# Patient Record
Sex: MALE | Race: AMERICAN INDIAN OR ALASKA NATIVE
[De-identification: names, ages, dates, MRNs, and addresses within clinical notes are randomized per-mention and may not be internally consistent; named-entity substitution may affect disease eponyms.]

---

## 2021-02-03 ENCOUNTER — HOSPITAL ENCOUNTER (EMERGENCY)
Dept: HOSPITAL 5 - ED | Age: 21
Discharge: LEFT BEFORE DISCHARGE | End: 2021-02-03
Payer: SELF-PAY

## 2021-02-03 VITALS — SYSTOLIC BLOOD PRESSURE: 155 MMHG | DIASTOLIC BLOOD PRESSURE: 66 MMHG

## 2021-02-03 DIAGNOSIS — Z53.21: ICD-10-CM

## 2021-02-03 DIAGNOSIS — M25.572: Primary | ICD-10-CM

## 2021-02-03 NOTE — EMERGENCY DEPARTMENT REPORT
Chief Complaint: Extremity Injury, Lower


Stated Complaint: ANKLE INJURY


Time Seen by Provider: 02/03/21 07:52





- HPI


History of Present Illness: 





20-year-old -American male presents to the emergency room for left ankle 

injury.  Patient states that he had sprained his ankle while at work yesterday. 

Patient presents to the emergency room today.  Patient is taking nothing for his

pain.  Patient states he has been able to walk on it without any problems.  

Patient reports his pain is located in the medial aspect of his left ankle.





- Exam


Vital Signs: 


                                   Vital Signs











  02/03/21





  07:48


 


Temperature 98.5 F


 


Pulse Rate 88


 


Respiratory 20





Rate 


 


Blood Pressure 155/66





[Right] 


 


O2 Sat by Pulse 99





Oximetry 











Physical Exam: 





Alert and oriented x3 no acute distress nontoxic in appearance


Left ankle full range of motion no obvious deformity minimal swelling on the 

opposite side of the pain.  Nonerythematous


Patient is ambulating without difficulties.


MSE screening note: 


Focused history and physical exam performed.


Due to findings the following was ordered:





20-year-old -American male presents to the emergency room for left ankle 

injury.  Patient states that he had sprained his ankle while at work yesterday. 

 Patient presents to the emergency room today.  Patient is taking nothing for 

his pain.  Patient states he has been able to walk on it without any problems.  

Patient reports his pain is located in the medial aspect of his left ankle.








Discussed with patient that he does have a ankle sprain recommend Ace bandage 

Tylenol ibuprofen or Aleve for pain management.  Referral to Dr. Cunningham if 

symptoms persist.  Work excuse attached to paperwork.





ED Disposition for MSE


Clinical Impression: 


Ankle sprain


Qualifiers:


 Encounter type: initial encounter Involved ligament of ankle: unspecified 

ligament Laterality: left Qualified Code(s): S93.402A - Sprain of unspecified 

ligament of left ankle, initial encounter





Disposition: Z-07 MED SCREENING EXAM-LEFT


Is pt being admited?: No


Does the pt Need Aspirin: No


Condition: Stable


Instructions:  Ankle Sprain, Easy-to-Read


Additional Instructions: 


Recommend ibuprofen or Tylenol or even Aleve for pain management.  You can place

 an ice bandage on your ankle.


Referrals: 


RUCHI CUNNINGHAM MD [Staff Physician] - 3-5 Days


Forms:  Work/School Release Form(ED)

## 2021-09-30 ENCOUNTER — HOSPITAL ENCOUNTER (EMERGENCY)
Dept: HOSPITAL 5 - ED | Age: 21
LOS: 1 days | Discharge: HOME | End: 2021-10-01
Payer: SELF-PAY

## 2021-09-30 DIAGNOSIS — F20.9: Primary | ICD-10-CM

## 2021-09-30 DIAGNOSIS — F31.9: ICD-10-CM

## 2021-09-30 DIAGNOSIS — I10: ICD-10-CM

## 2021-09-30 DIAGNOSIS — Z72.89: ICD-10-CM

## 2021-09-30 DIAGNOSIS — Z79.899: ICD-10-CM

## 2021-09-30 LAB
BASOPHILS # (AUTO): 0.2 K/MM3 (ref 0–0.1)
BASOPHILS NFR BLD AUTO: 2.2 % (ref 0–1.8)
BUN SERPL-MCNC: 15 MG/DL (ref 9–20)
BUN/CREAT SERPL: 15 %
CALCIUM SERPL-MCNC: 9.5 MG/DL (ref 8.4–10.2)
EOSINOPHIL # BLD AUTO: 0.2 K/MM3 (ref 0–0.4)
EOSINOPHIL NFR BLD AUTO: 2.3 % (ref 0–4.3)
HCT VFR BLD CALC: 40 % (ref 35.5–45.6)
HEMOLYSIS INDEX: 22
HGB BLD-MCNC: 13.4 GM/DL (ref 11.8–15.2)
LYMPHOCYTES # BLD AUTO: 1.7 K/MM3 (ref 1.2–5.4)
LYMPHOCYTES NFR BLD AUTO: 24.9 % (ref 13.4–35)
MCHC RBC AUTO-ENTMCNC: 34 % (ref 32–34)
MCV RBC AUTO: 91 FL (ref 84–94)
MONOCYTES # (AUTO): 0.7 K/MM3 (ref 0–0.8)
MONOCYTES % (AUTO): 10.6 % (ref 0–7.3)
PLATELET # BLD: 293 K/MM3 (ref 140–440)
RBC # BLD AUTO: 4.42 M/MM3 (ref 3.65–5.03)

## 2021-09-30 PROCEDURE — 85025 COMPLETE CBC W/AUTO DIFF WBC: CPT

## 2021-09-30 PROCEDURE — 80307 DRUG TEST PRSMV CHEM ANLYZR: CPT

## 2021-09-30 PROCEDURE — 36415 COLL VENOUS BLD VENIPUNCTURE: CPT

## 2021-09-30 PROCEDURE — 81001 URINALYSIS AUTO W/SCOPE: CPT

## 2021-09-30 PROCEDURE — 80048 BASIC METABOLIC PNL TOTAL CA: CPT

## 2021-09-30 PROCEDURE — G0480 DRUG TEST DEF 1-7 CLASSES: HCPCS

## 2021-09-30 PROCEDURE — 99284 EMERGENCY DEPT VISIT MOD MDM: CPT

## 2021-09-30 PROCEDURE — 80320 DRUG SCREEN QUANTALCOHOLS: CPT

## 2021-09-30 NOTE — EMERGENCY DEPARTMENT REPORT
ED Psych HPI





- General


Chief Complaint: Psych


Stated Complaint: IRREGULAR HEARTBEAT


Time Seen by Provider: 09/30/21 21:06


Source: patient


Mode of arrival: Ambulatory





- History of Present Illness


Initial Comments: 





20-year-old male, history of bipolar disorder, schizophrenia, presents to ED for

mental health evaluation.  According to triage note, patient's mother called EMS

because patient was standing on the corner wearing only his underwear.  Mother 

reports patient's heart rate was elevated at home, however it is normal here in 

the ED.  Mother reports patient is noncompliant with his medications.  Patient 

refusing to answer any questions.  When I asked the patient if he was going to 

speak to me, patient shook his head "no".





MD Complaint: other


-: This evening


Improves With: medication


Worsens With: none


Context: not taking psychiatric


Treatments Prior to Arrival: none





- Related Data


                                  Previous Rx's











 Medication  Instructions  Recorded  Last Taken  Type


 


Ibuprofen [Motrin 800 MG tab] 800 mg PO Q8HR PRN #30 tablet 06/30/20 Unknown Rx











                                    Allergies











Allergy/AdvReac Type Severity Reaction Status Date / Time


 


No Known Allergies Allergy   Verified 02/03/21 07:45














ED Review of Systems


ROS: 


Stated complaint: IRREGULAR HEARTBEAT


Other details as noted in HPI





Comment: Unobtainable due to pts medical conditions





ED Past Medical Hx





- Past Medical History


Previous Medical History?: Yes


Hx Hypertension: Yes


Hx Psychiatric Treatment: Yes (schizophrenia bipolar)


Additional medical history: bronchitis





- Surgical History


Past Surgical History?: No





- Social History


Smoking Status: Never Smoker


Substance Use Type: Alcohol





- Medications


Home Medications: 


                                Home Medications











 Medication  Instructions  Recorded  Confirmed  Last Taken  Type


 


Ibuprofen [Motrin 800 MG tab] 800 mg PO Q8HR PRN #30 tablet 06/30/20  Unknown Rx














ED Physical Exam





- General


Limitations: No Limitations


General appearance: alert, in no apparent distress





- Head


Head exam: Present: atraumatic, normocephalic





- Eye


Eye exam: Present: normal appearance, EOMI





- ENT


ENT exam: Present: mucous membranes moist





- Neck


Neck exam: Present: normal inspection





- Respiratory


Respiratory exam: Present: normal lung sounds bilaterally.  Absent: respiratory 

distress





- Cardiovascular


Cardiovascular Exam: Present: regular rate, normal rhythm





- GI/Abdominal


GI/Abdominal exam: Absent: distended





- Extremities Exam


Extremities exam: Present: normal inspection





- Neurological Exam


Neurological exam: Present: alert





- Psychiatric


Psychiatric exam: Present: flat affect





- Skin


Skin exam: Present: warm, dry, intact, normal color





ED Course


                                   Vital Signs











  09/30/21 09/30/21 09/30/21





  20:26 21:28 21:57


 


Temperature 98.8 F 98.5 F 


 


Pulse Rate 74 79 


 


Respiratory 18 18 18





Rate   


 


Blood Pressure 150/59  


 


Blood Pressure  136/61 





[Left]   


 


O2 Sat by Pulse 94 95 98





Oximetry   














ED Medical Decision Making





- Lab Data


Result diagrams: 


                                 09/30/21 21:06





                                 09/30/21 21:06





- Medical Decision Making





20-year-old male, history of schizophrenia, bipolar, presents to ED with bizarre

 behavior.  Patient is reportedly not taking his psychiatric medications.  

Patient refusing to answer questions with me.  Vital signs are stable.  Labs are

 unremarkable.  Still awaiting urine for UA and tox screen, however patient is 

medically clear for mental health evaluation.  Will dispo per psych.


Critical care attestation.: 


If time is entered above; I have spent that time in minutes in the direct care 

of this critically ill patient, excluding procedure time.








ED Disposition


Clinical Impression: 


 Schizophrenia





Condition: Stable

## 2021-10-01 VITALS — DIASTOLIC BLOOD PRESSURE: 80 MMHG | SYSTOLIC BLOOD PRESSURE: 152 MMHG

## 2021-10-01 LAB
BILIRUB UR QL STRIP: (no result)
BLOOD UR QL VISUAL: (no result)
MUCOUS THREADS #/AREA URNS HPF: (no result) /HPF
PH UR STRIP: 6 [PH] (ref 5–7)
PROT UR STRIP-MCNC: (no result) MG/DL
RBC #/AREA URNS HPF: < 1 /HPF (ref 0–6)
UROBILINOGEN UR-MCNC: < 2 MG/DL (ref ?–2)
WBC #/AREA URNS HPF: 4 /HPF (ref 0–6)

## 2021-10-01 NOTE — CONSULTATION
History of Present Illness





- Reason for Consult


Consult date: 10/01/21


Reason for consult: mental health evaluation





- History of Present Psychiatric Illness


 Per Ed Note:20-year-old male, history of bipolar disorder, schizophrenia, 

presents to ED for mental health evaluation.  According to triage note, 

patient's mother called EMS because patient was standing on the corner wearing 

only his underwear.  Mother reports patient's heart rate was elevated at home, 

however it is normal here in the ED.  Mother reports patient is noncompliant 

with his medications.  Patient refusing to answer any questions.  When I asked 

the patient if he was going to speak to me, patient shook his head "no".








Fuad Hirsch is a 20 year old male with diagnosis of Schizophrenia, Bipolar 

who presents to the ED for mental health evaluation. In my interview with the 

patient, he was guarded. The patient is unable to states why he came to the ED; 

his states his mother brought him" I don't even know why." He denies any current

suicidal/homicidal ideation and denies hallucinations.








Psych History


Diagnoses: Schizophrenia, Bipolar


Suicide attempts or Self-harm behavior:Yes


Prior psychiatric hospitalizations: Yes


Substance Abuse history:Denies


Previous psychiatric medications tried:currently on Lithium, Zyprexa


Outpatient treatment: Yes





PAST MEDICAL HISTORY: none reported





Family Psychiatric History: None reported or documented





SOCIAL HISTORY


Marital Status: Single


Living Arrangements: Lives mother and grand mother


Employment Status:unemployed


Access to guns/weapons: Denies


Education:12th Grade


History of Abuse: Denies


Legal History: none reported





REVIEW OF SYSTEMS


Constitutional: Negative for weight loss


ENT: Negative for stridor


Respiratory: Negative for cough or hemoptysis


All other systems reviewed and are negative


 


MENTAL STATUS EXAMINATION


General Appearance and Behavior: Age appropriate, good hygiene, wearing 

appropriate clothes, sleeping, cooperative


Cooperation: Participating but drowsy


Psychomotor Behavior: unremarkable and within normal limits


Mood: "OK"


Affect and affective range: congruent with mood, labile


Thought Process:Goal directed


Thought Content: Not suicidal


Speech: Normal volume, Regular rate and rhythm, 


Suicidal Ideation: Denies


Homicidal Ideation:Denies


Hallucinations: Yes, auditory


Delusions: None elicited


Impulse Control: Questionable


Insight and Judgment: Limited insight and poor judgment,


Memory: Normal, 


Attention: Normal


Orientation: Alert, oriented





 Assessment and Plan 


(1)Bipolar, unspecified- F31.9





Treatment plan


Continue previously prescribed medications


The patient to comply with previously prescribed medications


Risks, benefits and alternatives of medications discussed with the patient, 

questions answered and consent obtained from patient.


PSYCHOTHERAPY: Supportive psychotherapy provided


MEDICAL: Per primary team


DELIRIUM PRECAUTIONS: Please re-orient patient frequently, keep lights on during

the day, and minimize benzodiazepines and opiates as these medications could 

worsen patient's confusion.


SAFETY SITTER: Defer to primary


Disposition:Do not Recommend acute psychiatric inpatient treatment.  

will provide patient with outpatient resources.


Will sign off.


Thank you for the consult. Please contact with any questions and/or concerns.


Case staffed with Dr. Urena








Medications and Allergies


                                        





Medications and Allergies


                                    Allergies











Allergy/AdvReac Type Severity Reaction Status Date / Time


 


No Known Allergies Allergy   Verified 02/03/21 07:45











                                Home Medications











 Medication  Instructions  Recorded  Confirmed  Last Taken  Type


 


Ibuprofen [Motrin 800 MG tab] 800 mg PO Q8HR PRN #30 tablet 06/30/20  Unknown Rx














Mental Status Exam





- Vital signs


                                Last Vital Signs











Temp  98.5 F   10/01/21 10:26


 


Pulse  82   10/01/21 10:26


 


Resp  16   10/01/21 10:26


 


BP  111/67   10/01/21 10:26


 


Pulse Ox  100   10/01/21 10:26














Results


Result Diagrams: 


                                 09/30/21 21:06





                                 09/30/21 21:06


                              Abnormal lab results











  09/30/21 09/30/21 09/30/21 Range/Units





  21:06 21:06 21:06 


 


Mono % (Auto)     (0.0-7.3)  %


 


Baso % (Auto)     (0.0-1.8)  %


 


Baso # (Auto)     (0.0-0.1)  K/mm3


 


Glucose    108 H  ()  mg/dL


 


Salicylates  < 0.3 L    (2.8-20.0)  mg/dL


 


Acetaminophen   5.0 L   (10.0-30.0)  ug/mL














  09/30/21 Range/Units





  21:06 


 


Mono % (Auto)  10.6 H  (0.0-7.3)  %


 


Baso % (Auto)  2.2 H  (0.0-1.8)  %


 


Baso # (Auto)  0.2 H  (0.0-0.1)  K/mm3


 


Glucose   ()  mg/dL


 


Salicylates   (2.8-20.0)  mg/dL


 


Acetaminophen   (10.0-30.0)  ug/mL








All other labs normal.

## 2021-10-01 NOTE — EMERGENCY DEPARTMENT REPORT
Blank Doc





- Documentation


Documentation: 





20-year-old male with schizophrenia and bipolar behavior.


Vital signs and labs reviewed without acute abnormalities 


Still awaiting urine collection


No events overnight as per nursing notes


Patient is not on a 1013


awaiting official mental health consult (they are awaiting patient medical 

clearance based on urine collection prior to placement)